# Patient Record
Sex: MALE | Race: BLACK OR AFRICAN AMERICAN | NOT HISPANIC OR LATINO | Employment: OTHER | URBAN - METROPOLITAN AREA
[De-identification: names, ages, dates, MRNs, and addresses within clinical notes are randomized per-mention and may not be internally consistent; named-entity substitution may affect disease eponyms.]

---

## 2017-01-03 RX ORDER — SYRINGE,SAFETY WITH NEEDLE,1ML 25GX1"
SYRINGE (EA) MISCELLANEOUS
Qty: 100 EACH | Refills: 11 | Status: SHIPPED | OUTPATIENT
Start: 2017-01-03

## 2017-01-03 NOTE — TELEPHONE ENCOUNTER
----- Message from Charlette Manzanares sent at 1/3/2017 12:46 PM CST -----  Contact: [facility]-[phone]  [phone] states pt is out of needles for pre filled insulin pen. Pt needs order. Pt can be reached at [phone].      [facility] [place] - [place] - [address]  [address] [facility]  [place] [address]  Phone: [phone] Fax: [phone]

## 2017-01-09 ENCOUNTER — PATIENT OUTREACH (OUTPATIENT)
Dept: ADMINISTRATIVE | Facility: HOSPITAL | Age: 72
End: 2017-01-09

## 2017-01-09 NOTE — PROGRESS NOTES
Most recent 2016 hemoglobin A1C routed to [employer] as attestation documentation for Diabetes Care-Blood Sugar Controlled measure.  Measure has been met.

## 2017-02-11 LAB
CHOLEST SERPL-MSCNC: 147 MG/DL (ref 0–200)
HDLC SERPL-MCNC: 21 MG/DL
LDLC SERPL CALC-MCNC: 92 MG/DL
TRIGLYCERIDE (LIPID PAN): 171

## 2017-02-14 RX ORDER — GABAPENTIN 300 MG/1
CAPSULE ORAL
Qty: 90 CAPSULE | Refills: 0 | Status: SHIPPED | OUTPATIENT
Start: 2017-02-14

## 2017-02-16 ENCOUNTER — OFFICE VISIT (OUTPATIENT)
Dept: FAMILY MEDICINE | Facility: CLINIC | Age: 72
End: 2017-02-16
Payer: MEDICARE

## 2017-02-16 VITALS
WEIGHT: 191 LBS | BODY MASS INDEX: 28.21 KG/M2 | DIASTOLIC BLOOD PRESSURE: 71 MMHG | SYSTOLIC BLOOD PRESSURE: 125 MMHG | HEART RATE: 72 BPM | TEMPERATURE: 98 F

## 2017-02-16 DIAGNOSIS — G40.909 SEIZURE DISORDER: Primary | ICD-10-CM

## 2017-02-16 DIAGNOSIS — N18.30 ACUTE RENAL FAILURE SUPERIMPOSED ON STAGE 3 CHRONIC KIDNEY DISEASE: ICD-10-CM

## 2017-02-16 DIAGNOSIS — I50.42 CHRONIC COMBINED SYSTOLIC AND DIASTOLIC CONGESTIVE HEART FAILURE: ICD-10-CM

## 2017-02-16 DIAGNOSIS — N17.9 ACUTE RENAL FAILURE SUPERIMPOSED ON STAGE 3 CHRONIC KIDNEY DISEASE: ICD-10-CM

## 2017-02-16 DIAGNOSIS — E11.51 PERIPHERAL VASCULAR DISEASE IN DIABETES MELLITUS: ICD-10-CM

## 2017-02-16 DIAGNOSIS — J44.9 CHRONIC OBSTRUCTIVE PULMONARY DISEASE, UNSPECIFIED COPD TYPE: ICD-10-CM

## 2017-02-16 DIAGNOSIS — Z91.148 NONCOMPLIANCE WITH MEDICATION REGIMEN: ICD-10-CM

## 2017-02-16 DIAGNOSIS — E11.59 HYPERTENSION ASSOCIATED WITH DIABETES: ICD-10-CM

## 2017-02-16 DIAGNOSIS — I15.2 HYPERTENSION ASSOCIATED WITH DIABETES: ICD-10-CM

## 2017-02-16 PROCEDURE — 99999 PR PBB SHADOW E&M-EST. PATIENT-LVL III: CPT | Mod: PBBFAC,,, | Performed by: FAMILY MEDICINE

## 2017-02-16 PROCEDURE — 99499 UNLISTED E&M SERVICE: CPT | Mod: S$GLB,,, | Performed by: FAMILY MEDICINE

## 2017-02-16 RX ORDER — LEVETIRACETAM 750 MG/1
750 TABLET ORAL 2 TIMES DAILY
COMMUNITY
Start: 2017-02-13

## 2017-02-16 RX ORDER — LANOLIN ALCOHOL/MO/W.PET/CERES
400 CREAM (GRAM) TOPICAL DAILY
Qty: 30 TABLET | Refills: 5 | Status: SHIPPED | OUTPATIENT
Start: 2017-02-16

## 2017-02-16 RX ORDER — PANTOPRAZOLE SODIUM 40 MG/1
40 TABLET, DELAYED RELEASE ORAL DAILY
Qty: 30 TABLET | Refills: 11 | Status: SHIPPED | OUTPATIENT
Start: 2017-02-16

## 2017-02-16 RX ORDER — SODIUM BICARBONATE 650 MG/1
650 TABLET ORAL 2 TIMES DAILY
COMMUNITY
Start: 2016-03-16 | End: 2017-02-16 | Stop reason: SDUPTHER

## 2017-02-16 RX ORDER — SODIUM BICARBONATE 650 MG/1
650 TABLET ORAL 2 TIMES DAILY
Qty: 60 TABLET | Refills: 5 | Status: SHIPPED | OUTPATIENT
Start: 2017-02-16

## 2017-02-16 RX ORDER — CARVEDILOL 12.5 MG/1
12.5 TABLET ORAL 2 TIMES DAILY WITH MEALS
Qty: 60 TABLET | Refills: 11 | Status: SHIPPED | OUTPATIENT
Start: 2017-02-16

## 2017-02-16 RX ORDER — ATORVASTATIN CALCIUM 20 MG/1
20 TABLET, FILM COATED ORAL DAILY
Qty: 30 TABLET | Refills: 11 | Status: SHIPPED | OUTPATIENT
Start: 2017-02-16

## 2017-02-16 RX ORDER — TAMSULOSIN HYDROCHLORIDE 0.4 MG/1
0.4 CAPSULE ORAL NIGHTLY
COMMUNITY
Start: 2017-02-14

## 2017-02-16 RX ORDER — INSULIN GLARGINE 100 [IU]/ML
25 INJECTION, SOLUTION SUBCUTANEOUS NIGHTLY
COMMUNITY
Start: 2017-01-18

## 2017-02-16 RX ORDER — ALBUTEROL SULFATE 2.5 MG/.5ML
2.5 SOLUTION RESPIRATORY (INHALATION) EVERY 6 HOURS PRN
COMMUNITY
Start: 2016-03-16

## 2017-02-16 RX ORDER — LANOLIN ALCOHOL/MO/W.PET/CERES
400 CREAM (GRAM) TOPICAL DAILY
COMMUNITY
Start: 2016-03-16 | End: 2017-02-16 | Stop reason: SDUPTHER

## 2017-02-16 RX ORDER — ISOSORBIDE DINITRATE 20 MG/1
20 TABLET ORAL 3 TIMES DAILY
Qty: 90 TABLET | Refills: 11 | Status: SHIPPED | OUTPATIENT
Start: 2017-02-16 | End: 2018-02-16

## 2017-02-16 NOTE — PROGRESS NOTES
Patient presents follow-up 2 separate hospitalizations as below.  Has not had any further seizures.  He is missing several of his medications.  Denies chest pain or shortness of breath.  States has follow-up scheduled with nephrology and neurology.  He somewhat unsure about how much insulin he is taking.  His hemoglobin A1c was elevated in the hospital though glucose control seems to be fairly good at discharge.      Evan Avila MD - 02/13/2017 2:25 PM CST  DATES OF SERVICE: 02/10/2017 - 02/13/2017    ADMITTING DIAGNOSES:     1. Acute renal failure on chronic kidney disease.  2. Seizure disorder.  3. Anemia of chronic disease.  4. Diabetes mellitus.  5. Hypertension.  6. Diabetic neuropathy.    DISCHARGE DIAGNOSES:    1. Acute renal failure on chronic kidney disease.  2. Seizure disorder.  3. Anemia of chronic disease.  4. Diabetes mellitus.  5. Hypertension.  6. Diabetic neuropathy.    CONSULTATIONS REQUESTED DURING HOSPITAL COURSE: Nephrology consult with Dr. Kamara and Neurology consult with Dr. Ricci.    HOSPITAL COURSE: The patient is a 72-year-old male. He was recently seen at   [facility] with seizure. The patient was discharged from the   hospital to skilled nursing facility and the patient has completed his SNF   therapy and he has recently returned his home. At home, according to   patient's wife, he had a seizure-like activity. He was brought to the   hospital. By the time the patient arrived to the hospital, he was   asymptomatic and feeling fine and wanted to go home. Considering the   patient's acute renal failure, he was admitted to the hospitalist service and   in the hospital, the patient has another tonic-clonic generalized seizure and   neurology was consulted. The patient's Keppra dose was increased, and the   patient remained seizure free in the hospital. For the patient's acute kidney  injury, he was started with gentle IV fluid hydration. His diuretic dose was  also  adjusted and the patient's renal function has improved to his baseline.   No other problems are noticed. The only issue was that the patient's family   was not very helpful as far as providing information or getting the necessary   information to us in regard to the patient's home medication and I had a   suspicion that the patient was not taking Keppra at home or he was not taking   correctly, but we could not verify the patient's medication. After   consultation with a neurology, it was decided that it is in the patient's best  interest to increase his Keppra dose to 750 mg and reevaluate his medicine   through the [facility] home assistance program and Home Health. On 02/13/2017,   the patient is back to baseline. He is alert, oriented, feeling well and he   is ready to go home, so he is getting discharged home.    PHYSICAL EXAMINATION:   VITAL SIGNS: Most recent vital signs show temperature 98.2, pulse 63,   respirations 18, blood pressure 108/55.  HEENT: PERRLA. Extraocular movements intact. The sclerae are anicteric. Mouth  and throat without lesions. Moist mucous membranes.  NECK: Supple, with a good range of motion. There is no lymphadenopathy or   thyromegaly. No carotid bruits.  CHEST: Good inspiratory effort. The lungs are clear to auscultation and   percussion. There is good air exchange.  HEART: Regular rate and rhythm. There is no appreciable murmur or gallop. The  PMI is not displaced.  ABDOMEN: Soft. Active bowel sounds are present. Non-tender to palpation.   There is no organomegaly.  EXTREMITIES: Peripheral pulses are 2+ and intact. There is no peripheral   edema. All joints have a full range of motion.  NEUROLOGIC: Alert and oriented x 3. Cranial nerves II through XII are grossly  intact. Sensory and motor exams are grossly intact.  SKIN: Color is normal for age and gender, no rash or petechiae.    LABORATORY DATA: Most recent blood work result shows WBC count 5.3,   hemoglobin 9.4, hematocrit 29.3,  platelets 169. Glucose 161, BUN 58,   creatinine 2.44, sodium 137, potassium 4.7, chloride 107, CO2 is 27.    CONDITION UPON DISCHARGE: Stable.    ACTIVITY: As tolerated.    DIET: Regular diabetic and cardiac.    MEDICATIONS: Please refer to discharge med reconciliation form for complete   list of discharge medication. The patient is also referred to home health to   follow up with the patient at home. Educate patient and his family on   medication management and make sure that the patient has all the prescribed   medication. The patient will follow up with his primary care doctor, Dr. Covington in neurology clinic.    Total discharge time in coordination of care, review of medication, explaining  the care plan, coordination of discharge more than 30 minutes.    Evan Avila MD  For a full list of reconciled medications on discharge, please refer to the   Discharge Medication List Summary for Patient Use, and/or the Discharge   Orders.   V# [id number] D# [id number]  D: ga/WT: DS/T: trace  DD: 02/13/2017 16:19 TD: 02/13/2017 17:59         Evan Avila MD - 01/18/2017 12:10 PM CST  DATES OF SERVICE: 01/12/2017 - 1/18/2017    ADMITTING DIAGNOSES:     1. Altered mental status.  2. Uncontrolled diabetes mellitus.  3. Congestive heart failure.  4. Right lower lobe pneumonia.  5. Uncontrolled hypertension.  6. Peripheral arterial disease.  7. Seizure disorder.    DISCHARGE DIAGNOSES:    1. Altered mental status.  2. Uncontrolled diabetes mellitus.  3. Congestive heart failure.  4. Right lower lobe pneumonia.  5. Uncontrolled hypertension.  6. Peripheral arterial disease.  7. Seizure disorder.    CONSULTATIONS REQUESTED DURING HOSPITAL COURSE: Endocrine consult with Dr. Yrn Sierra and Neurology consult with Dr. Ricci.    PROCEDURE PERFORMED DURING THE HOSPITAL COURSE: EEG.    HOSPITAL COURSE: The patient is a 71-year-old male who presented to the   Emergency Room with a complaint of altered mental status. The patient  was   seen in the Emergency Room and initially it was suspected that the patient has  pneumonia and there was also suspicion of possible seizure disorder. The   patient was admitted to the hospitalist service, started on broad-spectrum IV   antibiotic and EEG was done which was consistent with seizure activity. The   patient was started on antiepileptic medication and as the patient's pneumonia  got better, his mental status has improved, but patient is severely   deconditioned and requires maximum assistance. Patient's diabetes is also   uncontrolled. The patient was referred to inpatient rehab facility and now   the patient is accepted at swing bed facility at [place]. He is getting   discharged to swing bed.    PHYSICAL EXAMINATION:   VITAL SIGNS: In the last 24 hours, his maximum temperature was 99.7, pulse   76, respirations 18, blood pressure 148/59.  HEENT: PERRLA. Extraocular movements intact. The sclerae are anicteric. Mouth  and throat without lesions. Moist mucous membranes.  NECK: Supple, with a good range of motion. There is no lymphadenopathy or   thyromegaly. No carotid bruits.  CHEST: Good inspiratory effort. The lungs are clear to auscultation and   percussion. There is good air exchange.  HEART: Regular rate and rhythm. There is no appreciable murmur or gallop. The  PMI is not displaced.  ABDOMEN: Soft. Active bowel sounds are present. Non-tender to palpation.   There is no organomegaly.  EXTREMITIES: Peripheral pulses are 2+ and intact. There is no peripheral   edema. All joints have a full range of motion.  NEUROLOGIC: Alert and oriented x 3. Cranial nerves II through XII are grossly  intact. Sensory and motor exams are grossly intact.  SKIN: Color is normal for age and gender, no rash or petechiae.    LABORATORY DATA: The patient's most recent blood work result shows WBC count   6.0, hemoglobin 8.9, hematocrit 29.2, platelets 186. Glucose 147, BUN 49,   creatinine 2.28, sodium 135, potassium  4.8, chloride 104, CO2 of 26.    CONDITION UPON DISCHARGE: Stable.    ACTIVITY: As tolerated and as per rehab recommendations.    DIET: Cardiac and diabetic.    MEDICATIONS: Please refer to discharge med reconciliation form for complete   list of discharge medications. Further care and treatment as per [facility] facility and physician.    Total discharge time in coordination of care, review of medication, explaining  the care plan, coordination of discharge more than 30 minutes.    Evan Avila MD  For a full list of reconciled medications on discharge, please refer to the   Discharge Medication List Summary for Patient Use, and/or the Discharge   Orders.   V# [id number] D# [id number]  D: ga/WT: DS/T: sun  DD: 01/18/2017 11:38 TD: 01/18/2017 12:10       Past Medical History:  Past Medical History   Diagnosis Date    AAA (abdominal aortic aneurysm)     Adenomatous colon polyp 02/03/2017    B12 deficiency anemia     Bacteremia associated with intravascular line 2012    CHF (congestive heart failure)     Cholelithiasis     Chronic kidney disease     COPD (chronic obstructive pulmonary disease)     Coronary atherosclerosis     Degenerative joint disease     Depression     Diabetes mellitus, type 2     Gastritis     GI AVM (gastrointestinal arteriovenous vascular malformation)     Gout     Hemorrhage of gastrointestinal tract     Hip fracture     History of colonic polyps     Hx MRSA infection     Hyperlipidemia LDL goal < 70     Hypertension     Iron deficiency anemia     Lumbar stenosis     Lumbosacral spondylosis without myelopathy     MGUS (monoclonal gammopathy of unknown significance)     Myocardial infarction     Paroxysmal atrial fibrillation      Resolved    Peptic ulcer disease with hemorrhage     Peripheral vascular disease     Polymyalgia rheumatica     Rheumatoid arthritis     S/P insertion of IVC (inferior vena caval) filter     Type 2 diabetes mellitus with  proteinuria or albuminuria     UTI (lower urinary tract infection)      Past Surgical History   Procedure Laterality Date    Previous inferior vena cava filter placement      Coronary artery bypass graft      Fracture surgery       repair of an acetabular fracture    Knee surgery      Cataract surgery      Total hip arthroplasty       Left    Esophagogastroduodenoscopy  1/24/2013     Social History     Social History    Marital status:      Spouse name: N/A    Number of children: N/A    Years of education: N/A     Occupational History    Not on file.     Social History Main Topics    Smoking status: Current Every Day Smoker     Packs/day: 1.00     Years: 40.00    Smokeless tobacco: Not on file    Alcohol use No    Drug use: No    Sexual activity: No     Other Topics Concern    Not on file     Social History Narrative     Family History   Problem Relation Age of Onset    Diabetes Mother     Diabetes Father      Review of patient's allergies indicates:   Allergen Reactions    Benazepril hcl      Itchy (skin)^    Benazepril-hydrochlorothiazide Swelling    Benicar [olmesartan] Other (See Comments)     hyperkalemia  hyperkalemia    Lotensin [benazepril]     Nsaids (non-steroidal anti-inflammatory drug)     Ramipril      Current Outpatient Prescriptions on File Prior to Visit   Medication Sig Dispense Refill    amlodipine (NORVASC) 5 MG tablet Take 5 mg by mouth once daily.       baclofen (LIORESAL) 20 MG tablet Take 20 mg by mouth 2 (two) times daily.      blood sugar diagnostic (BLOOD GLUCOSE TEST) Strp Check glucose 3 times per day before each meal Dx: Diabetes Mellitus E11.9 100 strip 11    blood-glucose meter Misc Check glucose 3 times per day before each meal Dx: Diabetes Mellitus E11.9 1 each 0    ferrous gluconate (FERGON) 324 MG tablet Take 1 tablet (324 mg total) by mouth 2 (two) times daily. 30 tablet 11    folic acid (FOLVITE) 1 MG tablet Take 1 mg by mouth once daily.       furosemide (LASIX) 40 MG tablet Take 1 tablet (40 mg total) by mouth 2 (two) times daily. (Patient taking differently: Take 40 mg by mouth once daily. ) 60 tablet 11    gabapentin (NEURONTIN) 300 MG capsule TAKE ONE CAPSULE EVERY DAY DAY ONE, ONE TWICE DAILY DAY TWO, THEN ONE CAP THREE TIMES DAILY 90 capsule 0    hydrALAZINE (APRESOLINE) 25 MG tablet Take 1 tablet (25 mg total) by mouth 3 (three) times daily. 90 tablet 11    hydrocodone-acetaminophen 7.5-325mg (NORCO) 7.5-325 mg per tablet Take 1 tablet by mouth 3 (three) times daily as needed.  0    insulin lispro (HUMALOG KWIKPEN) 100 unit/mL InPn pen Inject into the skin 3x daily before meals. 201-249=4 htglp269-526=4 ctkhk675-672=0 rgdix803-994=02 units>/=400=12 units (Patient taking differently: Inject 10 Units into the skin 3 (three) times daily before meals. ) 15 mL 11    insulin syringe-needle U-100 1 mL 31 gauge x 5/16 Syrg Use as directed tid 100 each 11    lancets Misc Check glucose 3 times per day before each meal Dx: Diabetes Mellitus E11.9 100 each 11    multivit-iron-FA-calcium-mins 9 mg iron-400 mcg Tab tablet Take 1 tablet by mouth.      aspirin 81 MG Chew Take 81 mg by mouth once daily.      [DISCONTINUED] atorvastatin (LIPITOR) 20 MG tablet Take 1 tablet (20 mg total) by mouth once daily. 30 tablet 11    [DISCONTINUED] carvedilol (COREG) 12.5 MG tablet Take 1 tablet (12.5 mg total) by mouth 2 (two) times daily with meals. 60 tablet 11    [DISCONTINUED] isosorbide dinitrate (ISORDIL) 20 MG tablet Take 1 tablet (20 mg total) by mouth 3 (three) times daily. 90 tablet 11    [DISCONTINUED] pantoprazole (PROTONIX) 40 MG tablet Take 1 tablet (40 mg total) by mouth once daily. 30 tablet 11     No current facility-administered medications on file prior to visit.            ROS:  GENERAL: No fever, chills,  or significant weight changes.   CARDIOVASCULAR: Denies chest pain, PND, orthopnea.  ABDOMEN: Appetite fine. Denies diarrhea, abdominal  pain, hematemesis or blood in stool.  URINARY: No flank pain, dysuria or hematuria.      OBJECTIVE:     Vitals:    02/16/17 1107   BP: 125/71   Pulse: 72   Temp: 98 °F (36.7 °C)   Weight: 86.6 kg (191 lb)     Wt Readings from Last 3 Encounters:   02/16/17 86.6 kg (191 lb)   12/14/16 86.7 kg (191 lb 4 oz)   11/14/16 82.1 kg (181 lb)     APPEARANCE: Well nourished, well developed, in no acute distress.  Using wheelchair today  HEAD: Normocephalic.  Atraumatic.  No sinus tenderness.  EYES:   Right eye: Pupil reactive.  Conjunctiva clear.    Left eye: Pupil reactive.  Conjunctiva clear.     EOMI.    EARS: TM's intact. Light reflex normal. No retraction or perforation.    NOSE:  clear.  MOUTH & THROAT:  No pharyngeal erythema or exudate. No lesions.  NECK: Supple. No bruits.  No JVD.  No cervical lymphadenopathy.  No thyromegaly.    CHEST: Breath sounds clear bilaterally.  Normal respiratory effort  CARDIOVASCULAR: Normal rate.  Regular rhythm.  No murmurs.  No rub.  No gallops.  ABDOMEN: Bowel sounds normal.  Soft.  No tenderness.  No organomegaly.  PERIPHERAL VASCULAR: No cyanosis.  No clubbing.  No edema.  NEUROLOGIC: No focal findings.  MENTAL STATUS: Alert.  Oriented x 3.    Diagnoses and all orders for this visit:    Seizure disorder    Acute renal failure superimposed on stage 3 chronic kidney disease    Hypertension associated with diabetes    Peripheral vascular disease in diabetes mellitus    Chronic combined systolic and diastolic congestive heart failure    Chronic obstructive pulmonary disease, unspecified COPD type    Noncompliance with medication regimen    Other orders  -     atorvastatin (LIPITOR) 20 MG tablet; Take 1 tablet (20 mg total) by mouth once daily.  -     carvedilol (COREG) 12.5 MG tablet; Take 1 tablet (12.5 mg total) by mouth 2 (two) times daily with meals.  -     isosorbide dinitrate (ISORDIL) 20 MG tablet; Take 1 tablet (20 mg total) by mouth 3 (three) times daily.  -     pantoprazole  (PROTONIX) 40 MG tablet; Take 1 tablet (40 mg total) by mouth once daily.  -     magnesium oxide (MAG-OX) 400 mg tablet; Take 1 tablet (400 mg total) by mouth once daily.  -     sodium bicarbonate 650 MG tablet; Take 1 tablet (650 mg total) by mouth 2 (two) times daily.      We refilled the medications that he was missing as above.  Will not worry about refilling Tradjenta as I doubt he would be able to afford and we'll try to get him controlled with insulin.  We'll have him come back in a month with home glucose readings.  States recent eye exam try to request report.    Transitional Care Note    Family and/or Caretaker present at visit?  No.  Diagnostic tests reviewed/disposition: No diagnosic tests pending after this hospitalization.  Disease/illness education: yes  Home health/community services discussion/referrals: Patient has home health established at [facility].   Establishment or re-establishment of referral orders for community resources: No other necessary community resources.   Discussion with other health care providers: Note sent to home health.

## 2017-02-23 ENCOUNTER — TELEPHONE (OUTPATIENT)
Dept: FAMILY MEDICINE | Facility: CLINIC | Age: 72
End: 2017-02-23

## 2017-02-23 NOTE — TELEPHONE ENCOUNTER
Patient informed he must see Dr Delgado and then come back to see Dr Covington after, verbalized understanding.

## 2017-02-23 NOTE — TELEPHONE ENCOUNTER
----- Message from Charlette Manzanares sent at 2/23/2017  1:27 PM CST -----  Contact: pt  Pt requests nurse to call him to discuss letter needed for continuing to drive. Pt can be reached at [phone].

## 2017-03-02 ENCOUNTER — PATIENT OUTREACH (OUTPATIENT)
Dept: ADMINISTRATIVE | Facility: HOSPITAL | Age: 72
End: 2017-03-02
Payer: MEDICARE

## 2017-03-02 NOTE — LETTER
March 2, 2017    Mannie Noriegado  [address] [facility] [address]           [facility]  [address]  [facility] [address]  Phone: [phone] Dear Mr. Robbins:    [facility] is committed to your overall health.  To help you get the most out of each of your visits, we will review your information to make sure you are up to date on all of your recommended tests and/or procedures.      Chace Covington MD has found that you may be due for   Health Maintenance Due   Topic    TETANUS VACCINE     Zoster Vaccine     Eye Exam         If you have had any of the above done at another facility, please bring the records or information with you so that your record at [facility] will be complete.    If you are currently taking medication, please bring it with you to your appointment for review.    We will be happy to assist you with scheduling any necessary appointments or you may contact the [facility] appointment desk at [phone] to schedule at your convenience.     Thank you for choosing [facility] for your healthcare needs,        If you have any questions or concerns, please don't hesitate to call.    Sincerely,    Bobbi HORTA LPN  Care Coordination Department  [facility]

## 2017-03-08 ENCOUNTER — TELEPHONE (OUTPATIENT)
Dept: PODIATRY | Facility: CLINIC | Age: 72
End: 2017-03-08

## 2017-03-14 PROCEDURE — 99496 TRANSJ CARE MGMT HIGH F2F 7D: CPT | Mod: S$GLB,,, | Performed by: FAMILY MEDICINE

## 2017-03-16 ENCOUNTER — TELEPHONE (OUTPATIENT)
Dept: PODIATRY | Facility: CLINIC | Age: 72
End: 2017-03-16

## 2017-04-24 LAB — HBA1C MFR BLD: 6.8 %

## 2017-05-04 ENCOUNTER — PATIENT OUTREACH (OUTPATIENT)
Dept: ADMINISTRATIVE | Facility: HOSPITAL | Age: 72
End: 2017-05-04

## 2017-05-04 LAB
CALCIUM SERPL-MCNC: 8.4 MG/DL
CHLORIDE: 108
CREAT SERPL-MCNC: 1.9 MG/DL
GLUCOSE: 118
POTASSIUM: 3.8
SODIUM: 135
UREA NITROGEN (BUN): 17

## 2017-05-04 NOTE — LETTER
May 4, 2017    Mannie SANON Itzel  [address] [facility] [address]           [facility]  [address]  [facility] [address]  Phone: [phone] Dear Mr. Robbins:    [facility] is committed to your overall health.  To help you get the most out of each of your visits, we will review your information to make sure you are up to date on all of your recommended tests and/or procedures.      Chace Covington MD has found that you may be due for   Health Maintenance Due   Topic    TETANUS VACCINE     Zoster Vaccine     Eye Exam     Urine Microalbumin       If you have had any of the above done at another facility, please bring the records or information with you so that your record at [facility] will be complete.    If you are currently taking medication, please bring it with you to your appointment for review.    We will be happy to assist you with scheduling any necessary appointments or you may contact the [facility] appointment desk at [phone] to schedule at your convenience.     Thank you for choosing [facility] for your healthcare needs,      If you have any questions or concerns, please don't hesitate to call.    Sincerely,    Bobbi HORTA LPN  Care Coordination Department  [facility]
